# Patient Record
Sex: FEMALE | Race: BLACK OR AFRICAN AMERICAN | Employment: OTHER | URBAN - METROPOLITAN AREA
[De-identification: names, ages, dates, MRNs, and addresses within clinical notes are randomized per-mention and may not be internally consistent; named-entity substitution may affect disease eponyms.]

---

## 2020-12-18 ENCOUNTER — TRANSCRIBE ORDER (OUTPATIENT)
Dept: SCHEDULING | Age: 65
End: 2020-12-18

## 2020-12-18 DIAGNOSIS — S46.219A BICEPS TENDON RUPTURE: Primary | ICD-10-CM

## 2021-01-04 ENCOUNTER — HOSPITAL ENCOUNTER (OUTPATIENT)
Dept: MRI IMAGING | Age: 66
Discharge: HOME | End: 2021-01-04
Payer: MEDICARE

## 2021-01-04 DIAGNOSIS — S46.219A BICEPS TENDON RUPTURE: ICD-10-CM

## 2021-01-04 PROCEDURE — 73218 MRI UPPER EXTREMITY W/O DYE: CPT

## 2021-09-28 ENCOUNTER — TRANSCRIBE ORDER (OUTPATIENT)
Dept: SCHEDULING | Age: 66
End: 2021-09-28

## 2021-09-28 DIAGNOSIS — M75.122 NONTRAUMATIC COMPLETE TEAR OF LEFT ROTATOR CUFF: Primary | ICD-10-CM

## 2021-10-13 ENCOUNTER — HOSPITAL ENCOUNTER (OUTPATIENT)
Dept: MRI IMAGING | Age: 66
Discharge: HOME | End: 2021-10-13
Attending: ORTHOPAEDIC SURGERY
Payer: MEDICARE

## 2021-10-13 DIAGNOSIS — M75.122 NONTRAUMATIC COMPLETE TEAR OF LEFT ROTATOR CUFF: ICD-10-CM

## 2021-10-13 PROCEDURE — 73221 MRI JOINT UPR EXTREM W/O DYE: CPT

## 2022-11-04 ENCOUNTER — TRANSCRIBE ORDER (OUTPATIENT)
Dept: SCHEDULING | Age: 67
End: 2022-11-04

## 2022-11-04 DIAGNOSIS — M25.511 RIGHT SHOULDER PAIN: Primary | ICD-10-CM

## 2022-11-16 ENCOUNTER — HOSPITAL ENCOUNTER (OUTPATIENT)
Dept: MRI IMAGING | Age: 67
Discharge: HOME | End: 2022-11-16
Attending: ORTHOPAEDIC SURGERY
Payer: MEDICARE

## 2022-11-16 DIAGNOSIS — M25.511 RIGHT SHOULDER PAIN: ICD-10-CM

## 2022-11-16 PROCEDURE — 73221 MRI JOINT UPR EXTREM W/O DYE: CPT

## 2023-02-02 ENCOUNTER — CLINICAL SUPPORT (OUTPATIENT)
Dept: DIABETES SERVICES | Age: 68
End: 2023-02-02
Payer: COMMERCIAL

## 2023-02-02 DIAGNOSIS — E11.00 TYPE 2 DIABETES MELLITUS WITH HYPEROSMOLARITY WITHOUT COMA, WITHOUT LONG-TERM CURRENT USE OF INSULIN (HCC): Primary | ICD-10-CM

## 2023-02-02 NOTE — PROGRESS NOTES
[facility] Program for Diabetes Health  Diabetes Self-Management Education & Support Program    Reason for Referral: DSMES  Referral Source: Jc Beasley MD  Services requested: DSMES       ASSESSMENT    From my perspective, the participant would benefit from [facility] specifically related to healthy eating, monitoring, healthy coping, and problem solving. Will adapt DSMES program to build on participant's skills score, confidence score, and preparedness score as noted in the Diabetes Skills, Confidence, and Preparedness Index. During the program, we will focus on providing DSMES that specifically addresses participant's interest in healthy eating, healthy coping, and problem solving, as shown by their reported readiness to change. The participant would be best served by attending weekly group class series. Diabetes Self-Management Education Follow-up Visit: April 2023       Clinical Presentation  Zcah Leblanc is a 77 y.o.  female referred for diabetes self-management education. Participant has Type 2 DM not on insulin for 1-10 years. Family history positivefor diabetes. Patient reports not receiving DSMES services in the past. Most recent A1c value: No results found for: HBA1C, [id number], [id number]    Diabetes-related medications:  Current dosing:   Key Antihyperglycemic Medications       Patient is on no antihyperglycemic meds. Blood Pressure Management  Key ACE/ARB Medications       Patient is on no ACE or ARB meds. Lipid Management  Key Antihyperlipidemia Meds       The patient is on no antihyperlipidemia meds. Clot Prevention  Key Anti-Platelet Anticoagulant Meds       The patient is on no antiplatelet meds or anticoagulants.             Learning Assessment  Learning objectives Educator assessment (2/2/2023)   Diabetes Disease Process  The participant can   A) describe diabetes in basic terms;   B) state the type of diabetes they have; &   C) state accepted blood glucose targets. Healthy Eating  The participant can   A) identify carbohydrate foods; &   B) accurately read food labels. Being Active  The participant can  A) state the benefits of physical activity;  B) report their current PA practices;  C) identify PA they would consider incorporating in their lives; &  D) develop an implementation plan. Monitoring  The participant can  A) operate their blood glucose meter; &  B) describe how they log their blood glucoses to share with their provider. Taking Medications  The participant can  A) name their diabetes medications;  B) state the purpose and dose;  C) note side effects; &  D) describe proper storage, disposal & transport (if appropriate). Healthy Coping  The participant can    A) describe their response to diabetes diagnosis; B) describe their specific coping mechanisms;  C) identify supportive people and/or other resources that positively support their diabetes self-care and health. Reducing Risks  The participant can describe the preventive measures used by providers to promote health and prevent diabetes complications. Problem Solving  The participant can   A) identify signs, symptoms & treatment of hypoglycemia;    B) identify signs, symptoms & treatment of hyperglycemia;  C) describe their sick day plan; &  D) identify BG patterns to discuss with their provider.        No  Yes  No        Yes  Yes        Yes  Yes  Yes  Yes        Yes  Yes        Yes  Yes  Yes  Yes        Yes  Yes  Yes        Yes          Yes  Yes  Yes  Yes     Characteristics to Learning   Barriers to Learning      None     Favorite Ways to Learn   [x] Lecture  [x] Slides  [] Reading [] Video-Internet  [] Cassettes/CDs/MP3's  [x] Interactive Small Groups [] Other       Behavioral Assessment  Current self-care practices  Educator assessment (2/2/2023)   Healthy Eating   Current practices    24-hour Dietary Recall:  Breakfast: scrambles eggs, cheese, Faroese toast, mejia.  2 pieces bread  Lunch: None  Dinner: Salad, turkey, ham, Nigerian dressing  Snacks: raisens  Beverages: water, coffee  Alcohol: None       Would benefit from DSMES related to Healthy Eating: Yes    Eats a carbohydrate controlled diet: No    Stage of change: Preparation      Being Active  Current practices  How many days during the past week have you performed physical activity where your heart beats faster and your breathing is harder than normal for 30 minutes or more? 3 day(s)    How many days in a typical week do you perform activity such as this?  3 day(s)     Would benefit from [facility] SYSTEM related to Being Active: Yes}      Exercises 150 minutes/week: Yes      Stage of change: Action     Monitoring  Current practices  Do you monitor your blood sugar? Yes    How often do you monitor? < 1x/day    What are the range of readings? 78-99 mg/dL  Do you know your last A1c measurement? Yes    Do you know the meaning of the A1c? No     Would benefit from [facility] related to Monitoring: Yes      Uses BG readings to establish trends and understand BG patterns: No      Stage of change: Preparation       Taking Medication  Current practices  Do you understand what your diabetes medications do? No    How often do you miss doses of your diabetes medications? not taking medications    Can you afford your diabetes medications? Yes   Would benefit from [facility] related to Taking Medication: Yes      Takes medications consistently to receive full benefit: Yes      Stage of change: Action       Healthy Coping   Current state  Diabetes Skills, Confidence and Preparedness Index: Total score: 6  Skills: 6.0  Confidence: 6.0  Preparedness: 6.0       Would benefit from DSMES related to Healthy Coping: Yes      Identifies specific people, organizations,etc, that actively support their diabetes self-care efforts: Yes      Stage of change: Action     Reducing Risks  Current state  Vaccines:  Influenza:    There is no immunization history on file for this patient. Pneumococcal:   There is no immunization history on file for this patient. Hepatitis:   There is no immunization history on file for this patient. Examinations:  No Diabetic HM Topics for this patient     Dental exam:  up to date    Foot exam: due    Heart Protection:  BP Readings from Last 2 Encounters:   No data found for BP        No results found for: LDL, LDLC, DLDLP     Kidney Protection:  No results found for: MCACR, MCA1, MCA2, MCA3, MCAU, MCAU2, MCALPOCT     Would benefit from [facility] SYSTEM related to Reducing Risks: Yes      Actively participates in decision-making with provider regarding secondary prevention:  Yes      Stage of change: Action   Problem Solving  Current state  Hypoglycemia Management:  What are signs and symptoms of hypoglycemia that you experience: Dizziness/light-headedness, Weakness, Nervous feeling, Trouble concentrating, Headache    How do you prevent hypoglycemia: consistent meals/snack time    How do you treat hypoglycemia: Rule of 15    Hyperglycemia Management:  What are signs and symptoms of hyperglycemia that you experience: Fatigue, headache    How can you prevent hyperglycemia: focus on carbohydrate counting/meal planning    Sick Day Management:  What do you do differently on sick days:  Pt reported being unaware of self-management on sick days    Pattern Management:  Do you notice blood glucose patterns when you look at the readings in your meter or logbook?  No    How do you use the blood glucose readings from your meter or logbook? understand how body responds to meals     Would benefit from [facility] SYSTEM related to Problem Solving: Yes      Articulates appropriate strategies to address hypoglycemia, hyperglycemia, sick day care and BG pattern: No      Stage of change: Preparation       Note: Content derived from the American Association of Diabetes Educators' Diabetes Education Curriculum: A Guide to Successful Self-Management (3rd edition)      Willi Hartman Mitchell West RN on 2/2/2023 at 9:22 AM    I have personally reviewed the health record, including provider notes, laboratory data and current medications before making these care and education recommendations. The time spent in this effort is included in the total time. Total minutes: 30    Diabetes Skills, Confidence & Preparedness Index (SCPI) ©  Thank you for completing the Skills, Confidence & Preparedness Index! Below are your scores. All scales and questions are out of 7. If you would like these results emailed, please enter your email address along with some identifying patient information. Email:    Patient Identifier:        Overall SCPI score: 6.0 Skills Score: 6.0  Low: Healthy Eating(Q1),Blood Sugar Monitoring(Q3),Reducing Risks(Q5),Problem Solving(Q6),Healthy Coping(Q7),Blood Sugar Monitoring(Q8),Reducing Risks(Q9) Confidence Score: 6.0  Low: Healthy Eating(Q1),Healthy Coping(Q2),Reducing Risks(Q3),Healthy Eating(Q4),Being Active(Q5),Blood Sugar Monitoring(Q6),Problem TMTWXQH(I0) Preparedness Score: 6.0  Low: Healthy Eating(Q1),Being Active(Q2),Healthy Coping(Q3),Blood Sugar Monitoring(Q6),Problem Solving(Q7)  Healthy Eating Score: 6.0  Low: Skills(Q1),Confidence(Q1),Confidence(Q4),Preparedness(Q1) Taking Medication Score: N/A Blood Sugar Monitoring Score: 6.0  Low: Skills(Q3),Skills(Q8),Confidence(Q6),Preparedness(Q6) Reducing Risks Score: 6.0  Low: Skills(Q5),Skills(Q9),Confidence(Q3)  Problem Solving Score: 6.0  Low: Skills(Q6),Confidence(Q7),Preparedness(Q7) Healthy Coping Score: 6.0  Low: Skills(Q7),Confidence(Q2),Preparedness(Q3) Being Active Score: 6.0  Low: Confidence(Q5),Preparedness(Q2)    Skills/Knowledge Questions  1. I know how to plan meals that have the best balance between carbohydrates, proteins and vegetables. 6  2. I know how my diabetes medications (pills, injectables and/or insulin) work in my body. 0  3.  I know when to check my blood sugar if I want to see how my body responded to a meal. 6  4. I know when to check my blood sugars to determine if my medication or insulin doses are correct. 0  5. I know what to do to prevent a low blood sugar when I exercise (either before, during, or after). 6  6. When I am sick, I know what to do differently with my diabetes management. 6  7. I know how stress can affect my diabetes management. 6  8. When I look at my blood sugars over a given week, I can explain what my blood sugar pattern is. 6  9. I know what my target levels are for A1c, blood pressure and cholesterol. 6  Confidence Questions  1. I am confident that I can plan balanced meals and snacks. 6  2. I am confident that I can manage my stress. 6  3. I am confident that I can prevent a low blood sugar during or after exercise. 6  4. I am confident that the next time I eat out, I will be able to choose foods that best keep my blood sugars in target. 6  5. I am confident I can include exercise into my schedule. 6  6. I am confident that I can use my daily blood sugars to adjust my diet, my activity, and/or my insulin. 6  7. When something out of my normal routine happens, I am confident that I can problem-solve and keep my diabetes on track. 6  Preparedness Questions  1. Within the next month, I will begin to eat more balanced meals and snacks. 6  2. Within the next month, I will choose an exercise activity and I will start fitting it into my schedule. 6  3. Within the next month, I will make a list of stress management options that work for me. 6  4. Within the next month, I will consistently plan ahead to prevent low blood sugars. 0  5. Within the next month, I will start adjusting my insulin doses on my own. 0  6. Within the next month, I will begin making changes to my diabetes management based on my daily blood sugars (eg - eating, activity and/or insulin). 6  7.  Within the next month, I will begin making changes to my diabetes management to meet my overall goals (eg - eating, activity and/or insulin).  6

## 2023-04-07 ENCOUNTER — CLINICAL SUPPORT (OUTPATIENT)
Dept: DIABETES SERVICES | Age: 68
End: 2023-04-07

## 2023-04-21 ENCOUNTER — CLINICAL SUPPORT (OUTPATIENT)
Dept: DIABETES SERVICES | Age: 68
End: 2023-04-21

## 2023-04-21 DIAGNOSIS — E11.00 TYPE 2 DIABETES MELLITUS WITH HYPEROSMOLARITY WITHOUT COMA, WITHOUT LONG-TERM CURRENT USE OF INSULIN (HCC): Primary | ICD-10-CM

## 2023-04-24 ENCOUNTER — CLINICAL SUPPORT (OUTPATIENT)
Dept: DIABETES SERVICES | Age: 68
End: 2023-04-24
Payer: COMMERCIAL

## 2023-04-24 DIAGNOSIS — E11.00 TYPE 2 DIABETES MELLITUS WITH HYPEROSMOLARITY WITHOUT COMA, WITHOUT LONG-TERM CURRENT USE OF INSULIN (HCC): Primary | ICD-10-CM

## 2023-04-24 PROCEDURE — G0109 DIAB MANAGE TRN IND/GROUP: HCPCS

## 2023-04-25 NOTE — PROGRESS NOTES
[employer] Program for Diabetes Health  Diabetes Self-Management Education & Support Program  Encounter Note    SUMMARY  Diabetes self-care management training was completed related to healthy coping and problem solving. the participant will return in 6 weeks to follow up on DSMES related to reducing risks, healthy eating, monitoring, taking medications, physical activity, healthy coping, and problem solving. The participant did not identify SMART Goal(s) and will practice knowledge and skills related to reducing risks, healthy eating and monitoring, being active and medications, and healthy coping and problem solving to improve diabetes self-management. EVALUATION:  Participant was engaged in learning and shared in group discussions. She shared that sometimes she feels frustrated with diabetes, especially when meal planning. She states that sometimes she feels she wants \"to throw in the towel\", but that she has so much to look forward to, she wont. When she feels this way or is experiencing stress, she states that she goes for a walk or talks with family. She also shared that she prays a lot. RECOMMENDATIONS:  6 week follow up     TOPICS DISCUSSED TODAY:  HOW DO I FIND SUPPORT TO TACKLE THIS CONDITION? 60  HOW DO I FIGURE OUT WHAT'S INFLUENCING MY BLOOD GLUCOSES? 60      Next provider visit is scheduled for 6 weeks       SMART GOAL(S)  Increase physical activity by video chair exercises for 15 minutes 3 times over the next week  ACHIEVEMENT OF GOAL(S) : %    2. Practice building a balanced meal for 2 meals  at least 2 times over the next week  ACHIEVEMENT OF GOAL(S) :  %    3. Practice problem solving self-care behavior by making podiatry appointment over the next week. , and will practice knowledge and skills related to reducing risks to improve diabetes self-management  ACHIEVEMENT OF GOAL(S) :  %         DATE DSMES TOPIC EVALUATION     4/25/2023 HOW DO I FIND SUPPORT TO TACKLE THIS CONDITION? Normal responses to diabetes diagnosis or complication   Shock   Anger & resentment   Guilt/self-blame   Sadness & worry   Depression    Anxiety   Pregnancy   Constructive strategies to normal responses    Exploring feelings & attitudes   Motivation: Cost versus benefits analysis   Problem-solving: Chain analysis   Obtaining support: Communicating   Family & friends   Health team   Community   Stress   Symptoms   Managing stress   Fill your tool kit        The participant can identify people that support them in caring for their diabetes health: patient and significant other      The participant would like to pursue the following in keeping themselves healthy after completing the program:  Diabetes publications         DATE DSMES TOPIC EVALUATION     4/25/2023 HOW DO I FIGURE OUT WHAT'S INFLUENCING MY BLOOD GLUCOSES? Problem solving using SOAR   Set goals   Sort options   Arrive at a plan   Reaffirm plan   Common problems in diabetes self-care   Hypoglycemia   Hyperglycemia   Sick days   Pattern management   Disaster preparedness plan        The participant has an action plan for   Hypoglycemia: Yes  Hyperglycemia: Yes  Sick days: Yes  During disasters: Yes         Roxane Finch RN on 4/25/2023 at 9:48 AM    I have personally reviewed the health record, including provider notes, laboratory data and current medications before making these care and education recommendations. The time spent in this effort is included in the total time. Total minutes: 555    [id number] [facility] Emergency Adaptations for Telehealth:  [facility], was evaluated through a synchronous (real-time) audio-video encounter. The patient (or guardian if applicable) is aware that this is a billable service, which includes applicable co-pays. This Virtual Visit was conducted with patient's (and/or legal guardian's) consent.  The visit was conducted pursuant to the emergency declaration under the [address] and the National Emergencies Act, [facility] waiver authority and the [employer] General Act. Patient identification was verified, and a caregiver was present when appropriate. The patient was located in a state where the provider was licensed to provide care.

## 2023-04-25 NOTE — PROGRESS NOTES
[facility] for Diabetes Health  Diabetes Self-Management Education & Support Program  Encounter Note    SUMMARY  Diabetes self-care management training was completed related to healthy coping and problem solving. the participant will return in 6 weeks to follow up on DSMES related to reducing risks, healthy eating, monitoring, taking medications, physical activity, healthy coping, and problem solving. The participant did not identify SMART Goal(s) and will practice knowledge and skills related to reducing risks, healthy eating and monitoring, being active and medications, and healthy coping and problem solving to improve diabetes self-management. EVALUATION:  Participant was engaged in learning and shared in group discussions. She shared that sometimes she gets frustrated with diabetes, especially when meal planning. She also stated that sometimes she feels like she wants \"to throw in the towel. \"  But that she has a lot to look forward to and would not do that. When she feels this way or is experiencing stress, she likes to walk and spend time with family. She also shared that she prays a lot. Personal goals met at 100%. RECOMMENDATIONS:  6 week follow up     TOPICS DISCUSSED TODAY:  HOW DO I FIND SUPPORT TO TACKLE THIS CONDITION? 60  HOW DO I FIGURE OUT WHAT'S INFLUENCING MY BLOOD GLUCOSES? 60      Next provider visit is scheduled for 6 weeks       SMART GOAL(S)  Increase physical activity by video chair exercises for 15 minutes 3 times over the next week  ACHIEVEMENT OF GOAL(S) : %    2. Practice building a balanced meal for 2 meals  at least 2 times over the next week  ACHIEVEMENT OF GOAL(S) :  %    3. Practice problem solving self-care behavior by making podiatry appointment over the next week. , and will practice knowledge and skills related to reducing risks to improve diabetes self-management  ACHIEVEMENT OF GOAL(S) :  %         DATE DSMES TOPIC EVALUATION 4/25/2023 HOW DO I FIND SUPPORT TO TACKLE THIS CONDITION? Normal responses to diabetes diagnosis or complication   Shock   Anger & resentment   Guilt/self-blame   Sadness & worry   Depression    Anxiety   Pregnancy   Constructive strategies to normal responses    Exploring feelings & attitudes   Motivation: Cost versus benefits analysis   Problem-solving: Chain analysis   Obtaining support: Communicating   Family & friends   Health team   Community   Stress   Symptoms   Managing stress   Fill your tool kit        The participant can identify people that support them in caring for their diabetes health: patient and significant other      The participant would like to pursue the following in keeping themselves healthy after completing the program:  Diabetes publications         DATE DSMES TOPIC EVALUATION     4/25/2023 HOW DO I FIGURE OUT WHAT'S INFLUENCING MY BLOOD GLUCOSES? Problem solving using SOAR   Set goals   Sort options   Arrive at a plan   Reaffirm plan   Common problems in diabetes self-care   Hypoglycemia   Hyperglycemia   Sick days   Pattern management   Disaster preparedness plan        The participant has an action plan for   Hypoglycemia: Yes  Hyperglycemia: Yes  Sick days: Yes  During disasters: Yes         Yusuf Juarez RN on 4/25/2023 at 9:53 AM    I have personally reviewed the health record, including provider notes, laboratory data and current medications before making these care and education recommendations. The time spent in this effort is included in the total time.   Total minutes: 120

## 2023-05-02 ENCOUNTER — OFFICE VISIT (OUTPATIENT)
Dept: PODIATRY | Age: 68
End: 2023-05-02
Payer: COMMERCIAL

## 2023-05-02 VITALS
HEART RATE: 54 BPM | SYSTOLIC BLOOD PRESSURE: 141 MMHG | HEIGHT: 66 IN | DIASTOLIC BLOOD PRESSURE: 76 MMHG | TEMPERATURE: 97.6 F | WEIGHT: 192 LBS | BODY MASS INDEX: 30.86 KG/M2

## 2023-05-02 DIAGNOSIS — B35.1 TINEA UNGUIUM: ICD-10-CM

## 2023-05-02 DIAGNOSIS — E11.42 DIABETIC SENSORIMOTOR NEUROPATHY (HCC): Primary | ICD-10-CM

## 2023-05-02 PROCEDURE — 99203 OFFICE O/P NEW LOW 30 MIN: CPT | Performed by: PODIATRIST

## 2023-05-02 PROCEDURE — 11721 DEBRIDE NAIL 6 OR MORE: CPT | Performed by: PODIATRIST

## 2023-05-02 PROCEDURE — 1123F ACP DISCUSS/DSCN MKR DOCD: CPT | Performed by: PODIATRIST

## 2023-05-02 RX ORDER — CICLOPIROX OLAMINE 7.7 MG/G
CREAM TOPICAL 2 TIMES DAILY
Qty: 30 G | Refills: 2 | Status: SHIPPED | OUTPATIENT
Start: 2023-05-02

## 2023-05-15 NOTE — PROGRESS NOTES
[employer] Program for Diabetes Health  Diabetes Self-Management Education & Support Program  Encounter Note    SUMMARY  Diabetes self-care management training was completed related to taking medications and physical activity. the participant will return on April 28 to continue DSMES related to healthy coping and problem solving. The participant did identify SMART Goal(s) and will practice knowledge and skills related to reducing risks, healthy eating and monitoring, and being active and medications to improve diabetes self-management. EVALUATION:  Participant was engaged in learning and shared in group discussions. She shared that she does not the way she feels on Trulicity. Encouraged to speak with provider about symptoms. She stated that she would like to go back on Metformin. She was able to perform group chair exercises during class. She shared her A1C was down to 5.4. RECOMMENDATIONS:  Continue DSMES classes. Continue physical activity       TOPICS DISCUSSED TODAY:  HOW DO MY DIABETES MEDICATIONS WORK? 61  HOW DOES PHYSICAL ACTIVITY AFFECT MY DIABETES? 60      Next provider visit is scheduled for 4/28/23       SMART GOAL(S)  Increase physical activity by video chair exercises for 15 minutes 3 times over the next week. ACHIEVEMENT OF GOAL(S) : 0-24%    2. Practice building a balanced meal for 2 meals  at least 2 times over the next week  ACHIEVEMENT OF GOAL(S) :  %    3. Practice problem solving self-care behavior by making podiatry appointment over the next week. , and will practice knowledge and skills related to reducing risks to improve diabetes self-management  ACHIEVEMENT OF GOAL(S) :  %         DATE DSMES TOPIC EVALUATION     4/21/2023 HOW DO MY DIABETES MEDICATIONS WORK?    Type 1 medications & methods   Insulin injections   Injection sites   Type 2 medications   Oral agents   GLP-1 agonists   Hypoglycemia symptoms & treatment   Glucagon emergency kits   General guidance No c/o leg cramps.   regarding insulin use whether Type 1, 2 or gestational diabetes   Storage of insulin   Disposal    Traveling with medications   Barriers to medication adherence      The participant   Can describe the expected action & side effects of prescribed diabetes medications: Yes  Can demonstrate injection technique (if applicable): Yes     DATE DSMES TOPIC EVALUATION     4/21/2023 HOW DOES PHYSICAL ACTIVITY AFFECT MY DIABETES? Benefits of physical activity   Beginning a program of physical activity   Walking   Pedometers   Goal setting   Structured physical activity program   Aerobic activity   Resistance   Flexibility   Balance   Physical activity program progression   Safety issues   Barriers to physical activity   Facilitators of physical activity        The participant has established a regular physical activity plan: Yes         Bailey Awad RN on 4/21/2023 at 2:44 PM    I have personally reviewed the health record, including provider notes, laboratory data and current medications before making these care and education recommendations. The time spent in this effort is included in the total time.   Total minutes: 120

## 2023-08-02 ENCOUNTER — OFFICE VISIT (OUTPATIENT)
Age: 68
End: 2023-08-02

## 2023-08-02 VITALS
RESPIRATION RATE: 16 BRPM | DIASTOLIC BLOOD PRESSURE: 79 MMHG | BODY MASS INDEX: 30.86 KG/M2 | SYSTOLIC BLOOD PRESSURE: 139 MMHG | HEIGHT: 66 IN | WEIGHT: 192 LBS | HEART RATE: 61 BPM

## 2023-08-02 DIAGNOSIS — I73.9 PAD (PERIPHERAL ARTERY DISEASE) (HCC): Primary | ICD-10-CM

## 2023-08-02 RX ORDER — LANOLIN ALCOHOL/MO/W.PET/CERES
CREAM (GRAM) TOPICAL
COMMUNITY
Start: 2023-06-28

## 2023-08-02 RX ORDER — FOLIC ACID 1 MG/1
TABLET ORAL
COMMUNITY
Start: 2023-05-31

## 2023-08-02 RX ORDER — PANTOPRAZOLE SODIUM 40 MG/1
40 TABLET, DELAYED RELEASE ORAL
COMMUNITY
Start: 2020-10-25 | End: 2023-10-07

## 2023-08-02 RX ORDER — HYDROCHLOROTHIAZIDE 25 MG/1
TABLET ORAL
COMMUNITY
Start: 2017-10-26

## 2023-08-02 RX ORDER — METHOTREXATE SODIUM 25 MG/ML
INJECTION, SOLUTION INTRA-ARTERIAL; INTRAMUSCULAR; INTRAVENOUS
COMMUNITY
Start: 2022-02-06

## 2023-08-02 RX ORDER — OMEGA-3-ACID ETHYL ESTERS 1 G/1
CAPSULE, LIQUID FILLED ORAL
COMMUNITY
Start: 2023-05-16

## 2023-08-02 RX ORDER — SITAGLIPTIN AND METFORMIN HYDROCHLORIDE 1000; 100 MG/1; MG/1
TABLET, FILM COATED, EXTENDED RELEASE ORAL
COMMUNITY
Start: 2022-02-05

## 2023-08-02 RX ORDER — METHOCARBAMOL 750 MG/1
750 TABLET, FILM COATED ORAL 3 TIMES DAILY
COMMUNITY
Start: 2021-08-16

## 2023-08-02 RX ORDER — TRAZODONE HYDROCHLORIDE 50 MG/1
50 TABLET ORAL
COMMUNITY
Start: 2023-04-18 | End: 2024-04-17

## 2023-08-02 RX ORDER — DIAZEPAM 5 MG/1
TABLET ORAL
COMMUNITY
Start: 2022-11-04

## 2023-08-02 RX ORDER — EZETIMIBE 10 MG/1
TABLET ORAL
COMMUNITY
Start: 2023-05-31

## 2023-08-02 RX ORDER — DULAGLUTIDE 1.5 MG/.5ML
INJECTION, SOLUTION SUBCUTANEOUS
COMMUNITY
Start: 2023-06-26

## 2023-08-02 RX ORDER — COLCHICINE 0.6 MG/1
TABLET ORAL
COMMUNITY
Start: 2020-11-23

## 2023-08-02 RX ORDER — ALLOPURINOL 100 MG/1
TABLET ORAL
COMMUNITY
Start: 2023-05-31

## 2023-08-02 RX ORDER — ROSUVASTATIN CALCIUM 20 MG/1
1 TABLET, COATED ORAL NIGHTLY
Qty: 30 TABLET | Refills: 11 | COMMUNITY
Start: 2023-02-09 | End: 2024-02-09

## 2023-08-02 RX ORDER — OFLOXACIN 3 MG/ML
0.3 SOLUTION/ DROPS OPHTHALMIC
COMMUNITY
Start: 2023-05-25 | End: 2024-05-24

## 2023-11-02 ENCOUNTER — HOSPITAL ENCOUNTER (OUTPATIENT)
Facility: HOSPITAL | Age: 68
Discharge: HOME | End: 2023-11-04
Attending: PODIATRIST
Payer: MEDICARE

## 2023-11-02 DIAGNOSIS — I73.9 PAD (PERIPHERAL ARTERY DISEASE) (HCC): ICD-10-CM

## 2023-11-02 LAB
VAS LEFT ABI: 1.3
VAS LEFT ARM BP: 111 MMHG
VAS LEFT DORSALIS PEDIS BP: 128 MMHG
VAS LEFT LOW THIGH PRESSURE: 146 MMHG
VAS LEFT PTA BP: 144 MMHG
VAS RIGHT ABI: 1.22
VAS RIGHT DORSALIS PEDIS BP: 132 MMHG
VAS RIGHT LOW THIGH PRESSURE: 137 MMHG
VAS RIGHT PTA BP: 135 MMHG

## 2023-11-02 PROCEDURE — 93923 UPR/LXTR ART STDY 3+ LVLS: CPT

## 2023-11-06 ENCOUNTER — OFFICE VISIT (OUTPATIENT)
Age: 68
End: 2023-11-06
Payer: MEDICARE

## 2023-11-06 VITALS
BODY MASS INDEX: 30.86 KG/M2 | DIASTOLIC BLOOD PRESSURE: 74 MMHG | HEIGHT: 66 IN | SYSTOLIC BLOOD PRESSURE: 130 MMHG | WEIGHT: 192 LBS | TEMPERATURE: 97 F | OXYGEN SATURATION: 98 % | HEART RATE: 52 BPM

## 2023-11-06 DIAGNOSIS — M79.675 PAIN IN TOES OF BOTH FEET: ICD-10-CM

## 2023-11-06 DIAGNOSIS — M79.674 PAIN IN TOES OF BOTH FEET: ICD-10-CM

## 2023-11-06 DIAGNOSIS — E11.42 TYPE 2 DIABETES MELLITUS WITH DIABETIC POLYNEUROPATHY, WITHOUT LONG-TERM CURRENT USE OF INSULIN (HCC): Primary | ICD-10-CM

## 2023-11-06 PROCEDURE — 99213 OFFICE O/P EST LOW 20 MIN: CPT | Performed by: PODIATRIST

## 2023-11-06 PROCEDURE — 1123F ACP DISCUSS/DSCN MKR DOCD: CPT | Performed by: PODIATRIST

## 2023-11-13 ASSESSMENT — ENCOUNTER SYMPTOMS
SHORTNESS OF BREATH: 0
VOMITING: 0
ABDOMINAL PAIN: 0
DIARRHEA: 0

## 2023-11-13 NOTE — PROGRESS NOTES
[facility] FOOT SURGERY         Patient Name: Val Chua    : 1955    Visit Date: 2023    Office Visit Note      Subjective:         Patient is a 77 y.o. female who is being seen in office follow up visit for bilateral lower extremity pain and numbness. Patient wants to discuss vascular studies. Past Medical History:   Diagnosis Date    Diabetes [facility])      Past Surgical History:   Procedure Laterality Date    ROTATOR CUFF REPAIR         No family history on file. Social History     Tobacco Use    Smoking status: Never    Smokeless tobacco: Never   Substance Use Topics    Alcohol use: Never     Allergies   Allergen Reactions    Atorvastatin Other (See Comments)     Reaction Type: Allergy; Reaction(s): cramps  Reaction Type: Allergy; Reaction(s): cramps      Diphenhydramine Itching     Reaction Type: Allergy  Reaction Type: Allergy      Penicillins Hives     Reaction Type: Allergy; Severity: Mild  Reaction Type: Allergy; Severity: Mild      Bupropion Other (See Comments)     Reaction Type: Allergy  Reaction Type: Allergy      Celecoxib Other (See Comments)     Reaction Type: Allergy  Reaction Type: Allergy      Terbinafine Rash     Reaction Type: Allergy; Severity: Moderate  Reaction Type: Allergy; Severity: Moderate       Prior to Admission medications    Medication Sig Start Date End Date Taking?  Authorizing Provider   traZODone (DESYREL) 50 MG tablet Take 1 tablet by mouth 23 Yes ProviderCourtney MD   SITagliptin-metFORMIN HCl ER (JANUMET XR) 100-1000 MG TB24  22  Yes Provider, MD Courtney   rosuvastatin (CRESTOR) 20 MG tablet Take 1 tablet by mouth nightly 23 Yes Provider, MD Courtney   omega-3 acid ethyl esters (LOVAZA) 1 g capsule  23  Yes Provider, MD Courtney   ofloxacin (OCUFLOX) 0.3 % solution Apply 0.3 % to eye 23 Yes Provider, MD Courtney   methotrexate Sodium (RHEUMATREX) 250 MG/10ML SOLN chemo injection

## 2024-02-07 ENCOUNTER — OFFICE VISIT (OUTPATIENT)
Age: 69
End: 2024-02-07
Payer: MEDICARE

## 2024-02-07 VITALS
OXYGEN SATURATION: 98 % | DIASTOLIC BLOOD PRESSURE: 68 MMHG | TEMPERATURE: 97.7 F | HEART RATE: 60 BPM | SYSTOLIC BLOOD PRESSURE: 104 MMHG

## 2024-02-07 DIAGNOSIS — E11.42 TYPE 2 DIABETES MELLITUS WITH DIABETIC POLYNEUROPATHY, WITHOUT LONG-TERM CURRENT USE OF INSULIN (HCC): Primary | ICD-10-CM

## 2024-02-07 DIAGNOSIS — B35.1 ONYCHOMYCOSIS: ICD-10-CM

## 2024-02-07 PROCEDURE — 11721 DEBRIDE NAIL 6 OR MORE: CPT | Performed by: PODIATRIST

## 2024-02-07 PROCEDURE — 99214 OFFICE O/P EST MOD 30 MIN: CPT | Performed by: PODIATRIST

## 2024-02-07 PROCEDURE — 1123F ACP DISCUSS/DSCN MKR DOCD: CPT | Performed by: PODIATRIST

## 2024-02-07 RX ORDER — CLOTRIMAZOLE 1 %
CREAM (GRAM) TOPICAL
Qty: 45 G | Refills: 3 | Status: SHIPPED | OUTPATIENT
Start: 2024-02-07 | End: 2024-02-14

## 2024-02-07 RX ORDER — GABAPENTIN 100 MG/1
100 CAPSULE ORAL 2 TIMES DAILY
Qty: 60 CAPSULE | Refills: 2 | Status: SHIPPED | OUTPATIENT
Start: 2024-02-07 | End: 2024-05-07

## 2024-02-07 RX ORDER — CLOTRIMAZOLE 1 %
CREAM (GRAM) TOPICAL
Qty: 45 G | Refills: 3 | Status: SHIPPED | OUTPATIENT
Start: 2024-02-07 | End: 2024-02-07

## 2024-02-07 RX ORDER — CANDESARTAN CILEXETIL AND HYDROCHLOROTHIAZIDE 16; 12.5 MG/1; MG/1
1 TABLET ORAL DAILY
COMMUNITY

## 2024-02-07 RX ORDER — CLOTRIMAZOLE 1 %
CREAM (GRAM) TOPICAL
Qty: 45 G | Refills: 1 | Status: SHIPPED | OUTPATIENT
Start: 2024-02-07 | End: 2024-02-14

## 2024-02-07 NOTE — PROGRESS NOTES
Chief Complaint   Patient presents with    Follow-up    Diabetes     Footcare and numbness at top of left foot near great toe     1. Have you been to the ER, urgent care clinic since your last visit?  Hospitalized since your last visit?No    2. Have you seen or consulted any other health care providers outside of the [facility] since your last visit?  Include any pap smears or colon screening. No    /68 (Site: Right Upper Arm, Position: Sitting, Cuff Size: Medium Adult)   Pulse 60   Temp 97.7 °F (36.5 °C) (Temporal)   SpO2 98%

## 2024-02-15 NOTE — PROGRESS NOTES
[facility] PODIATRY & FOOT SURGERY       Patient Name: Penny Mata    : 1955    Visit Date: 2024    Office Visit Note    Subjective:         Patient is a 69 y.o. female who is being seen as a follow up with a history of diabetes with a chief complain of painful elongated toenails. Patient primary care physician is Jama Villanueva MD. Patient states the last office visit with PCP was 24.  Patient describes diabetes as being under control. Patient's last hemoglobin A1c was 4.9.  Patient denies any overt pedal pain.  Patient denies any recent pedal trauma.  Patient denies any systemic signs of infection but does state nails are thick and discolored.  Patient also complains fo burning pain to the bilateral feet/.    Past Medical History:   Diagnosis Date    Diabetes (HCC)      Past Surgical History:   Procedure Laterality Date    ROTATOR CUFF REPAIR         No family history on file.   Social History     Tobacco Use    Smoking status: Never    Smokeless tobacco: Never   Substance Use Topics    Alcohol use: Never     Allergies   Allergen Reactions    Atorvastatin Other (See Comments)     Reaction Type: Allergy; Reaction(s): cramps  Reaction Type: Allergy; Reaction(s): cramps      Diphenhydramine Itching     Reaction Type: Allergy  Reaction Type: Allergy      Penicillins Hives     Reaction Type: Allergy; Severity: Mild  Reaction Type: Allergy; Severity: Mild      Bupropion Other (See Comments)     Reaction Type: Allergy  Reaction Type: Allergy      Celecoxib Other (See Comments)     Reaction Type: Allergy  Reaction Type: Allergy      Terbinafine Rash     Reaction Type: Allergy; Severity: Moderate  Reaction Type: Allergy; Severity: Moderate       Prior to Admission medications    Medication Sig Start Date End Date Taking? Authorizing Provider   candesartan-hydroCHLOROthiazide (ATACAND HCT) 16-12.5 MG per tablet Take 1 tablet by mouth daily   Yes Provider, MD Courtney

## 2024-03-08 ENCOUNTER — ANESTHESIA EVENT (OUTPATIENT)
Facility: HOSPITAL | Age: 69
End: 2024-03-08
Payer: MEDICARE

## 2024-03-08 ENCOUNTER — ANESTHESIA (OUTPATIENT)
Facility: HOSPITAL | Age: 69
End: 2024-03-08
Payer: MEDICARE

## 2024-03-08 ENCOUNTER — HOSPITAL ENCOUNTER (OUTPATIENT)
Facility: HOSPITAL | Age: 69
Setting detail: OUTPATIENT SURGERY
Discharge: HOME | End: 2024-03-08
Attending: INTERNAL MEDICINE | Admitting: INTERNAL MEDICINE
Payer: MEDICARE

## 2024-03-08 VITALS
DIASTOLIC BLOOD PRESSURE: 86 MMHG | RESPIRATION RATE: 18 BRPM | WEIGHT: 202.9 LBS | HEART RATE: 87 BPM | SYSTOLIC BLOOD PRESSURE: 119 MMHG | HEIGHT: 66 IN | BODY MASS INDEX: 32.61 KG/M2 | OXYGEN SATURATION: 99 % | TEMPERATURE: 98.1 F

## 2024-03-08 PROCEDURE — 3600007512: Performed by: INTERNAL MEDICINE

## 2024-03-08 PROCEDURE — 2709999900 HC NON-CHARGEABLE SUPPLY: Performed by: INTERNAL MEDICINE

## 2024-03-08 PROCEDURE — 7100000010 HC PHASE II RECOVERY - FIRST 15 MIN: Performed by: INTERNAL MEDICINE

## 2024-03-08 PROCEDURE — 7100000011 HC PHASE II RECOVERY - ADDTL 15 MIN: Performed by: INTERNAL MEDICINE

## 2024-03-08 PROCEDURE — 2500000003 HC RX 250 WO HCPCS: Performed by: NURSE ANESTHETIST, CERTIFIED REGISTERED

## 2024-03-08 PROCEDURE — 3700000001 HC ADD 15 MINUTES (ANESTHESIA): Performed by: INTERNAL MEDICINE

## 2024-03-08 PROCEDURE — 3700000000 HC ANESTHESIA ATTENDED CARE: Performed by: INTERNAL MEDICINE

## 2024-03-08 PROCEDURE — 3600007502: Performed by: INTERNAL MEDICINE

## 2024-03-08 PROCEDURE — 6360000002 HC RX W HCPCS: Performed by: NURSE ANESTHETIST, CERTIFIED REGISTERED

## 2024-03-08 PROCEDURE — 2580000003 HC RX 258: Performed by: INTERNAL MEDICINE

## 2024-03-08 PROCEDURE — 88305 TISSUE EXAM BY PATHOLOGIST: CPT

## 2024-03-08 PROCEDURE — 6370000000 HC RX 637 (ALT 250 FOR IP): Performed by: INTERNAL MEDICINE

## 2024-03-08 RX ORDER — SODIUM CHLORIDE 0.9 % (FLUSH) 0.9 %
5-40 SYRINGE (ML) INJECTION EVERY 12 HOURS SCHEDULED
Status: DISCONTINUED | OUTPATIENT
Start: 2024-03-08 | End: 2024-03-08 | Stop reason: HOSPADM

## 2024-03-08 RX ORDER — LIDOCAINE HYDROCHLORIDE 20 MG/ML
INJECTION, SOLUTION EPIDURAL; INFILTRATION; INTRACAUDAL; PERINEURAL PRN
Status: DISCONTINUED | OUTPATIENT
Start: 2024-03-08 | End: 2024-03-08 | Stop reason: SDUPTHER

## 2024-03-08 RX ORDER — SODIUM CHLORIDE 9 MG/ML
INJECTION, SOLUTION INTRAVENOUS CONTINUOUS
Status: DISCONTINUED | OUTPATIENT
Start: 2024-03-08 | End: 2024-03-08 | Stop reason: HOSPADM

## 2024-03-08 RX ORDER — GLYCOPYRROLATE 0.2 MG/ML
INJECTION INTRAMUSCULAR; INTRAVENOUS PRN
Status: DISCONTINUED | OUTPATIENT
Start: 2024-03-08 | End: 2024-03-08 | Stop reason: SDUPTHER

## 2024-03-08 RX ORDER — SIMETHICONE 40MG/0.6ML
SUSPENSION, DROPS(FINAL DOSAGE FORM)(ML) ORAL PRN
Status: DISCONTINUED | OUTPATIENT
Start: 2024-03-08 | End: 2024-03-08 | Stop reason: ALTCHOICE

## 2024-03-08 RX ORDER — SODIUM CHLORIDE 0.9 % (FLUSH) 0.9 %
5-40 SYRINGE (ML) INJECTION PRN
Status: DISCONTINUED | OUTPATIENT
Start: 2024-03-08 | End: 2024-03-08 | Stop reason: HOSPADM

## 2024-03-08 RX ORDER — SODIUM CHLORIDE 9 MG/ML
25 INJECTION, SOLUTION INTRAVENOUS PRN
Status: DISCONTINUED | OUTPATIENT
Start: 2024-03-08 | End: 2024-03-08 | Stop reason: HOSPADM

## 2024-03-08 RX ADMIN — SODIUM CHLORIDE: 9 INJECTION, SOLUTION INTRAVENOUS at 14:47

## 2024-03-08 RX ADMIN — GLYCOPYRROLATE 0.3 MG: 0.2 INJECTION INTRAMUSCULAR; INTRAVENOUS at 15:04

## 2024-03-08 RX ADMIN — PROPOFOL 75 MG: 10 INJECTION, EMULSION INTRAVENOUS at 15:06

## 2024-03-08 RX ADMIN — PROPOFOL 50 MG: 10 INJECTION, EMULSION INTRAVENOUS at 15:08

## 2024-03-08 RX ADMIN — LIDOCAINE HYDROCHLORIDE 100 MG: 20 INJECTION, SOLUTION EPIDURAL; INFILTRATION; INTRACAUDAL; PERINEURAL at 15:06

## 2024-03-08 RX ADMIN — PROPOFOL 25 MG: 10 INJECTION, EMULSION INTRAVENOUS at 15:11

## 2024-03-08 NOTE — ANESTHESIA PRE PROCEDURE
Department of Anesthesiology  Preprocedure Note       Name:  Penny Mata   Age:  69 y.o.  :  1955                                          MRN:  [id number]         Date:  3/8/2024      Surgeon: Surgeon(s):  Damien Muller MD    Procedure: Procedure(s):  ESOPHAGOGASTRODUODENOSCOPY    Medications prior to admission:   Prior to Admission medications    Medication Sig Start Date End Date Taking? Authorizing Provider   TURMERIC PO Take by mouth   Yes Courtney Sellers MD   Cobalamin Combinations (LIVER-B-12 PO) Take by mouth   Yes Courtney Sellers MD   candesartan-hydroCHLOROthiazide (ATACAND HCT) 16-12.5 MG per tablet Take 1 tablet by mouth daily    Courtney Sellers MD   gabapentin (NEURONTIN) 100 MG capsule Take 1 capsule by mouth 2 times daily for 90 days. Max Daily Amount: 200 mg 24  Andi Salazar DPM   traZODone (DESYREL) 50 MG tablet Take 3 tablets by mouth 23  Courtney Sellers MD   SITagliptin-metFORMIN HCl ER (JANUMET XR) 100-1000 MG TB24  22   Courtney Sellers MD   rosuvastatin (CRESTOR) 20 MG tablet Take 1 tablet by mouth nightly 2/9/23 3/8/24  Courtney Sellers MD   pantoprazole (PROTONIX) 40 MG tablet Take 1 tablet by mouth 10/25/20 3/8/24  Courtney Sellers MD   omega-3 acid ethyl esters (LOVAZA) 1 g capsule  23   Courtney Sellers MD   ofloxacin (OCUFLOX) 0.3 % solution Apply 0.3 % to eye  Patient not taking: Reported on 2024  Courtney Sellers MD   mupirocin (BACTROBAN) 2 % ointment Apply 2 % topically  Patient not taking: Reported on 3/8/2024 5/25/23 5/24/24  Courtney Sellers MD   methotrexate Sodium (RHEUMATREX) 250 MG/10ML SOLN chemo injection inject 0.8 milliliters ( 20 milligrams ) subcutaneously ONCE A WEEK 22   Courtney Sellers MD   methocarbamol (ROBAXIN) 750 MG tablet Take 1 tablet by mouth 3 times daily  Patient not taking: Reported on 2024   Provider, Historical,

## 2024-03-08 NOTE — ANESTHESIA POSTPROCEDURE EVALUATION
Department of Anesthesiology  Postprocedure Note    Patient: Penny Mata  MRN: [id number]  YOB: 1955  Date of evaluation: 3/8/2024    Procedure Summary       Date: 03/08/24 Room / Location: [facility] ENDO 03 / [facility] ENDOSCOPY    Anesthesia Start: 1504 Anesthesia Stop: 1516    Procedure: ESOPHAGOGASTRODUODENOSCOPY (Upper GI Region) Diagnosis:       Abdominal pain, unspecified abdominal location      (Abdominal pain, unspecified abdominal location [R10.9])    Surgeons: Damien Muller MD Responsible Provider: Rom Lloyd MD    Anesthesia Type: MAC ASA Status: 2            Anesthesia Type: MAC    Bindu Phase I: Bindu Score: 10    Bindu Phase II:      Anesthesia Post Evaluation    Patient location during evaluation: PACU  Patient participation: waiting for patient participation  Level of consciousness: awake  Airway patency: patent  Nausea & Vomiting: no vomiting and no nausea  Cardiovascular status: hemodynamically stable  Respiratory status: acceptable  Hydration status: stable  There was medical reason for not using a multimodal analgesia pain management approach.Pain management: adequate    No notable events documented.

## 2024-03-08 NOTE — DISCHARGE INSTRUCTIONS
[facility]  [facility]  MORRIS Styles MD  [phone]      March 8, 2024     Penny Mata  YOB: 1955    ENDOSCOPY DISCHARGE INSTRUCTIONS    If there is redness at IV site you should apply warm compress to area.  If redness or soreness persist contact Dr. Styles or your primary care doctor.    Gaseous discomfort may develop, but walking, belching will help relieve this.  You may not operate a vehicle for 12 hours  You may not operate machinery or dangerous appliances for rest of today  You may not drink alcoholic beverages for 12 hours  Avoid making any critical decisions for 24 hours    DIET:  You may resume your normal diet, but some patients find that heavy or large meals may lead to indigestion or vomiting.  I suggest a light meal as first food intake.    MEDICATIONS:  The use of some over-the-counter pain medication may lead to bleeding after biopsies or other procedures you may have had done.  Tylenol (also called acetaminophen) is safe to take and will not lead to bleeding.  Based on the procedure you had today you may safely take aspirin or aspirin-like products for the next seven (7) days.      ACTIVITY:  You may resume your normal household activities, but it is recommended that you spend the remainder of the day resting -  avoid any strenuous activity.     CALL DR. STYLES'S OFFICE IF:  Increasing pain, nausea, vomiting  Abdominal distension (swelling)  Significant new or increased bleeding (oral or rectal)  Fever/Chills  Chest pain/shortness of breath                   Additional instructions:   Impression: Elias's esophagus (C0M3) with surveillance biopsies taken. 3 cm hiatal hernia. Fundic gland polyps. Normal duodenum.            Recommendations:  - continue acid suppression regimen  - await pathology results  - repeat EGD in 3 months  - has GI clinic follow up     Please let me or my office staff  know if you have any feedback about today's procedure.    MORRIS Muller MD

## 2024-03-08 NOTE — INTERVAL H&P NOTE
Yes Yes   Sig: Take by mouth   allopurinol (ZYLOPRIM) 100 MG tablet 3/8/2024  Yes No   candesartan-hydroCHLOROthiazide (ATACAND HCT) 16-12.5 MG per tablet 3/8/2024  Yes No   Sig: Take 1 tablet by mouth daily   ciclopirox (LOPROX) 0.77 % cream Not Taking  Yes No   Sig: APPLY TO AFFECTED NAILS TWICE A DAY   Patient not taking: Reported on 3/8/2024   colchicine (COLCRYS) 0.6 MG tablet Not Taking  Yes No   Patient not taking: Reported on 2024   cyanocobalamin 50 MCG tablet Not Taking  Yes No   Sig: Take 1 tablet by mouth daily   Patient not taking: Reported on 2024   diazePAM (VALIUM) 5 MG tablet Not Taking  Yes No   Sig: Take 1-2 tablets by mouth 30 minutes prior to procedure   Patient not taking: Reported on 2024   ezetimibe (ZETIA) 10 MG tablet 3/8/2024  Yes No   folic acid (FOLVITE) 1 MG tablet 3/8/2024  Yes No   Sig: Take 2 tablets by mouth daily   gabapentin (NEURONTIN) 100 MG capsule 3/7/2024  No No   Sig: Take 1 capsule by mouth 2 times daily for 90 days. Max Daily Amount: 200 mg   hydroCHLOROthiazide (HYDRODIURIL) 25 MG tablet Not Taking  Yes No   Patient not taking: Reported on 2024   magnesium oxide (MAG-OX) 400 (240 Mg) MG tablet 3/8/2024  Yes No   Si tablet daily   methocarbamol (ROBAXIN) 750 MG tablet Not Taking  Yes No   Sig: Take 1 tablet by mouth 3 times daily   Patient not taking: Reported on 2024   methotrexate Sodium (RHEUMATREX) 250 MG/10ML SOLN chemo injection Past Week  Yes No   Sig: inject 0.8 milliliters ( 20 milligrams ) subcutaneously ONCE A WEEK   mupirocin (BACTROBAN) 2 % ointment Not Taking  Yes No   Sig: Apply 2 % topically   Patient not taking: Reported on 3/8/2024   ofloxacin (OCUFLOX) 0.3 % solution Not Taking  Yes No   Sig: Apply 0.3 % to eye   Patient not taking: Reported on 2024   omega-3 acid ethyl esters (LOVAZA) 1 g capsule 3/8/2024  Yes No   Patient not taking: Reported on 2024   pantoprazole (PROTONIX) 40 MG tablet 3/8/2024  Yes No   Sig: Take 1  tablet by mouth   rosuvastatin (CRESTOR) 20 MG tablet 3/7/2024  Yes No   Sig: Take 1 tablet by mouth nightly   traZODone (DESYREL) 50 MG tablet 3/7/2024  Yes No   Sig: Take 3 tablets by mouth      Facility-Administered Medications: None       PHYSICAL EXAM:  The patient is examined immediately prior to the procedure.  Vitals:    03/08/24 1405   BP: 111/81   Pulse: 56   Resp: 16   SpO2: 100%     Gen: Appears comfortable, no distress.  Pulm: comfortable respirations with no abnormal audible breath sounds  HEART: well perfused, no abnormal audible heart sounds  GI: abdomen flat.    PLAN:  Informed consent discussion held, patient afforded an opportunity to ask questions and all questions answered.  After being advised of the risks, benefits, and alternatives, the patient requested that we proceed and indicated so on a written consent form.      Will proceed with procedure as planned.  Damien Muller Jr, MD

## 2024-03-08 NOTE — OP NOTE
[facility]  [facility]  MORRIS Muller Jr, MD  [phone]      2024    Esophagogastroduodenoscopy (EGD) Procedure Note  Penny Mata  : 1955  [facility] Medical Record Number: [id number]      Indications:   History of Elias's esophagus  Referring Physician:  Jama Villanueva MD  Anesthesia/Sedation: See Anesthesia Record  Endoscopist:  Dr. MORRIS Muller Jr  Complications:  None  Estimated Blood Loss:  None    Surgical assistant: Circulator: Caity Schwartz RN  Endoscopy Technician: Daquan Tellez none unless otherwise specified.     Permit:  The indications, risks, benefits and alternatives were reviewed with the patient or their decision maker who was provided an opportunity to ask questions and all questions were answered.  The specific risks of esophagogastroduodenoscopy with conscious sedation were reviewed, including but not limited to anesthetic complication, bleeding, adverse drug reaction, missed lesion, infection, IV site reactions, and intestinal perforation which would lead to the need for surgical repair.  Alternatives to EGD including radiographic imaging, observation without testing, or laboratory testing were reviewed as well as the limitations of those alternatives discussed.  After considering the options and having all their questions answered, the patient or their decision maker provided both verbal and written consent to proceed.       Procedure in Detail:  After obtaining informed consent, positioning of the patient in the left lateral decubitus position, and conduction of a pre-procedure pause or \"time out\" the endoscope was introduced into the mouth and advanced to the duodenum.  A careful inspection was made, and findings or interventions are described below.    Findings:   Esophagus: No erosive esophagitis. There was a 3 cm segment of Elias's without

## 2024-03-08 NOTE — H&P
69 y.o. female presents for surveillance upper endoscopy given Elias's esophagus.  Additional H&P data will be attached on the day of procedure.    Damien Muller Jr, MD

## 2024-03-08 NOTE — PROGRESS NOTES

## 2024-05-07 ENCOUNTER — OFFICE VISIT (OUTPATIENT)
Age: 69
End: 2024-05-07

## 2024-05-07 VITALS
TEMPERATURE: 97.1 F | OXYGEN SATURATION: 97 % | HEART RATE: 60 BPM | DIASTOLIC BLOOD PRESSURE: 55 MMHG | SYSTOLIC BLOOD PRESSURE: 112 MMHG | BODY MASS INDEX: 32.78 KG/M2 | WEIGHT: 204 LBS | HEIGHT: 66 IN | RESPIRATION RATE: 16 BRPM

## 2024-05-07 DIAGNOSIS — E11.42 DIABETIC SENSORIMOTOR NEUROPATHY (HCC): ICD-10-CM

## 2024-05-07 DIAGNOSIS — B35.1 ONYCHOMYCOSIS: Primary | ICD-10-CM

## 2024-05-07 ASSESSMENT — PATIENT HEALTH QUESTIONNAIRE - PHQ9
1. LITTLE INTEREST OR PLEASURE IN DOING THINGS: NOT AT ALL
SUM OF ALL RESPONSES TO PHQ9 QUESTIONS 1 & 2: 0
SUM OF ALL RESPONSES TO PHQ QUESTIONS 1-9: 0
2. FEELING DOWN, DEPRESSED OR HOPELESS: NOT AT ALL
SUM OF ALL RESPONSES TO PHQ QUESTIONS 1-9: 0

## 2024-05-07 NOTE — PROGRESS NOTES
Chief Complaint   Patient presents with    3 Month Follow-Up     BP (!) 112/55   Pulse 60   Temp 97.1 °F (36.2 °C)   Resp 16   Ht 1.676 m (5' 6\")   Wt 92.5 kg (204 lb)   SpO2 97%   BMI 32.93 kg/m²   1. Have you been to the ER, urgent care clinic since your last visit?  Hospitalized since your last visit?No    2. Have you seen or consulted any other health care providers outside of the [facility] since your last visit?  Include any pap smears or colon screening. No

## 2024-07-23 ENCOUNTER — OFFICE VISIT (OUTPATIENT)
Age: 69
End: 2024-07-23
Payer: MEDICARE

## 2024-07-23 VITALS
DIASTOLIC BLOOD PRESSURE: 53 MMHG | TEMPERATURE: 98.4 F | BODY MASS INDEX: 31.34 KG/M2 | WEIGHT: 195 LBS | HEART RATE: 57 BPM | HEIGHT: 66 IN | OXYGEN SATURATION: 97 % | RESPIRATION RATE: 16 BRPM | SYSTOLIC BLOOD PRESSURE: 118 MMHG

## 2024-07-23 DIAGNOSIS — B35.1 ONYCHOMYCOSIS: Primary | ICD-10-CM

## 2024-07-23 DIAGNOSIS — E11.42 DIABETIC SENSORIMOTOR NEUROPATHY (HCC): ICD-10-CM

## 2024-07-23 PROCEDURE — 11721 DEBRIDE NAIL 6 OR MORE: CPT | Performed by: PODIATRIST

## 2024-07-23 PROCEDURE — 1123F ACP DISCUSS/DSCN MKR DOCD: CPT | Performed by: PODIATRIST

## 2024-07-23 PROCEDURE — 99213 OFFICE O/P EST LOW 20 MIN: CPT | Performed by: PODIATRIST

## 2024-07-23 RX ORDER — CLOTRIMAZOLE 1 %
CREAM (GRAM) TOPICAL
Qty: 45 G | Refills: 3 | Status: SHIPPED | OUTPATIENT
Start: 2024-07-23 | End: 2024-07-30

## 2024-07-23 ASSESSMENT — ENCOUNTER SYMPTOMS
ABDOMINAL PAIN: 0
SHORTNESS OF BREATH: 0
VOMITING: 0
DIARRHEA: 0

## 2024-07-23 NOTE — PROGRESS NOTES
[facility] PODIATRY & FOOT SURGERY       Patient Name: Penny Mata    : 1955    Visit Date: 2024    Office Visit Note    Subjective:         Patient is a 69 y.o. female who is being seen as a follow up with a history of diabetes with a chief complain of painful elongated toenails. Patient primary care physician is Jama Villanueva MD. Patient states the last office visit with PCP was 6/3/24.  Patient describes diabetes as being under control. Patient's last hemoglobin A1c was 4.9.  Patient denies any overt pedal pain.  Patient denies any recent pedal trauma.  Patient denies any systemic signs of infection but does state nails are thick and discolored.  Patient also complains fo burning pain to the bilateral feet/.    Past Medical History:   Diagnosis Date    Arthritis     Elias's esophagus     Diabetes (HCC)     Gout     Hyperlipidemia     Hypertension      Past Surgical History:   Procedure Laterality Date    JOINT REPLACEMENT Left     ROTATOR CUFF REPAIR Right 10/2022    UPPER GASTROINTESTINAL ENDOSCOPY N/A 3/8/2024    ESOPHAGOGASTRODUODENOSCOPY performed by Damien Muller MD at [facility] ENDOSCOPY       Family History   Problem Relation Age of Onset    Hypertension Sister       Social History     Tobacco Use    Smoking status: Never    Smokeless tobacco: Never   Substance Use Topics    Alcohol use: Yes     Comment: wine very rarely     Allergies   Allergen Reactions    Atorvastatin Other (See Comments)     Reaction Type: Allergy; Reaction(s): cramps  Reaction Type: Allergy; Reaction(s): cramps      Diphenhydramine Itching     Reaction Type: Allergy  Reaction Type: Allergy      Penicillins Hives     Reaction Type: Allergy; Severity: Mild  Reaction Type: Allergy; Severity: Mild      Bupropion Other (See Comments)     Reaction Type: Allergy  Reaction Type: Allergy      Celecoxib Other (See Comments)     Reaction Type: Allergy  Reaction Type: Allergy      Terbinafine Rash

## 2024-07-23 NOTE — PROGRESS NOTES
Chief Complaint   Patient presents with    Follow-up     BP (!) 118/53   Pulse 57   Temp 98.4 °F (36.9 °C)   Resp 16   Ht 1.676 m (5' 6\")   Wt 88.5 kg (195 lb)   SpO2 97%   BMI 31.47 kg/m²   \"Have you been to the ER, urgent care clinic since your last visit?  Hospitalized since your last visit?\"    NO    “Have you seen or consulted any other health care providers outside of [facility] since your last visit?”    NO    Have you had a mammogram?”   NO    No breast cancer screening on file         “Have you had a colorectal cancer screening such as a colonoscopy/FIT/Cologuard?    NO    No colonoscopy on file  No cologuard on file  No FIT/FOBT on file   No flexible sigmoidoscopy on file         Click Here for Release of Records Request

## (undated) DEVICE — CONTAINER SPEC 20ML NEUT BUFF FRMLN PREFIL STATLAB

## (undated) DEVICE — TUBING IRRIG COMPATIBLE W ERBE MEDIVATOR PMP HYDR

## (undated) DEVICE — FORCEPS BX L240CM JAW DIA2.4MM ORNG L CAP W/ NDL DISP RAD